# Patient Record
Sex: MALE | Race: WHITE
[De-identification: names, ages, dates, MRNs, and addresses within clinical notes are randomized per-mention and may not be internally consistent; named-entity substitution may affect disease eponyms.]

---

## 2021-03-31 ENCOUNTER — HOSPITAL ENCOUNTER (INPATIENT)
Dept: HOSPITAL 75 - ER | Age: 51
LOS: 2 days | Discharge: HOME | DRG: 246 | End: 2021-04-02
Attending: INTERNAL MEDICINE | Admitting: INTERNAL MEDICINE
Payer: SELF-PAY

## 2021-03-31 VITALS — DIASTOLIC BLOOD PRESSURE: 50 MMHG | SYSTOLIC BLOOD PRESSURE: 85 MMHG

## 2021-03-31 VITALS — SYSTOLIC BLOOD PRESSURE: 145 MMHG | DIASTOLIC BLOOD PRESSURE: 102 MMHG

## 2021-03-31 VITALS — DIASTOLIC BLOOD PRESSURE: 69 MMHG | SYSTOLIC BLOOD PRESSURE: 104 MMHG

## 2021-03-31 VITALS — SYSTOLIC BLOOD PRESSURE: 124 MMHG | DIASTOLIC BLOOD PRESSURE: 86 MMHG

## 2021-03-31 VITALS — SYSTOLIC BLOOD PRESSURE: 123 MMHG | DIASTOLIC BLOOD PRESSURE: 75 MMHG

## 2021-03-31 VITALS — SYSTOLIC BLOOD PRESSURE: 130 MMHG | DIASTOLIC BLOOD PRESSURE: 91 MMHG

## 2021-03-31 VITALS — WEIGHT: 199.52 LBS | BODY MASS INDEX: 31.31 KG/M2 | HEIGHT: 66.93 IN

## 2021-03-31 VITALS — SYSTOLIC BLOOD PRESSURE: 142 MMHG | DIASTOLIC BLOOD PRESSURE: 88 MMHG

## 2021-03-31 VITALS — SYSTOLIC BLOOD PRESSURE: 128 MMHG | DIASTOLIC BLOOD PRESSURE: 84 MMHG

## 2021-03-31 VITALS — SYSTOLIC BLOOD PRESSURE: 85 MMHG | DIASTOLIC BLOOD PRESSURE: 50 MMHG

## 2021-03-31 VITALS — DIASTOLIC BLOOD PRESSURE: 90 MMHG | SYSTOLIC BLOOD PRESSURE: 140 MMHG

## 2021-03-31 VITALS — SYSTOLIC BLOOD PRESSURE: 137 MMHG | DIASTOLIC BLOOD PRESSURE: 81 MMHG

## 2021-03-31 VITALS — DIASTOLIC BLOOD PRESSURE: 91 MMHG | SYSTOLIC BLOOD PRESSURE: 133 MMHG

## 2021-03-31 VITALS — SYSTOLIC BLOOD PRESSURE: 128 MMHG | DIASTOLIC BLOOD PRESSURE: 81 MMHG

## 2021-03-31 VITALS — SYSTOLIC BLOOD PRESSURE: 139 MMHG | DIASTOLIC BLOOD PRESSURE: 89 MMHG

## 2021-03-31 VITALS — DIASTOLIC BLOOD PRESSURE: 52 MMHG | SYSTOLIC BLOOD PRESSURE: 91 MMHG

## 2021-03-31 DIAGNOSIS — I50.21: ICD-10-CM

## 2021-03-31 DIAGNOSIS — I11.0: ICD-10-CM

## 2021-03-31 DIAGNOSIS — F17.210: ICD-10-CM

## 2021-03-31 DIAGNOSIS — E78.5: ICD-10-CM

## 2021-03-31 DIAGNOSIS — I25.10: ICD-10-CM

## 2021-03-31 DIAGNOSIS — I21.09: Primary | ICD-10-CM

## 2021-03-31 DIAGNOSIS — I25.5: ICD-10-CM

## 2021-03-31 LAB
ALBUMIN SERPL-MCNC: 3.9 GM/DL (ref 3.2–4.5)
ALBUMIN SERPL-MCNC: 5 GM/DL (ref 3.2–4.5)
ALP SERPL-CCNC: 58 U/L (ref 40–136)
ALP SERPL-CCNC: 74 U/L (ref 40–136)
ALT SERPL-CCNC: 65 U/L (ref 0–55)
ALT SERPL-CCNC: 90 U/L (ref 0–55)
APTT BLD: 29 SEC (ref 24–35)
BASOPHILS # BLD AUTO: 0.1 10^3/UL (ref 0–0.1)
BASOPHILS # BLD AUTO: 0.1 10^3/UL (ref 0–0.1)
BASOPHILS NFR BLD AUTO: 0 % (ref 0–10)
BASOPHILS NFR BLD AUTO: 0 % (ref 0–10)
BASOPHILS NFR BLD MANUAL: 0 %
BILIRUB SERPL-MCNC: 0.9 MG/DL (ref 0.1–1)
BILIRUB SERPL-MCNC: 1 MG/DL (ref 0.1–1)
BUN/CREAT SERPL: 7
BUN/CREAT SERPL: 8
CALCIUM SERPL-MCNC: 8.2 MG/DL (ref 8.5–10.1)
CALCIUM SERPL-MCNC: 9.4 MG/DL (ref 8.5–10.1)
CHLORIDE SERPL-SCNC: 103 MMOL/L (ref 98–107)
CHLORIDE SERPL-SCNC: 99 MMOL/L (ref 98–107)
CO2 SERPL-SCNC: 23 MMOL/L (ref 21–32)
CO2 SERPL-SCNC: 25 MMOL/L (ref 21–32)
CREAT SERPL-MCNC: 0.93 MG/DL (ref 0.6–1.3)
CREAT SERPL-MCNC: 0.96 MG/DL (ref 0.6–1.3)
EOSINOPHIL # BLD AUTO: 0 10^3/UL (ref 0–0.3)
EOSINOPHIL # BLD AUTO: 0 10^3/UL (ref 0–0.3)
EOSINOPHIL NFR BLD AUTO: 0 % (ref 0–10)
EOSINOPHIL NFR BLD AUTO: 0 % (ref 0–10)
EOSINOPHIL NFR BLD MANUAL: 0 %
GFR SERPLBLD BASED ON 1.73 SQ M-ARVRAT: > 60 ML/MIN
GFR SERPLBLD BASED ON 1.73 SQ M-ARVRAT: > 60 ML/MIN
GLUCOSE SERPL-MCNC: 138 MG/DL (ref 70–105)
GLUCOSE SERPL-MCNC: 153 MG/DL (ref 70–105)
HCT VFR BLD CALC: 43 % (ref 40–54)
HCT VFR BLD CALC: 49 % (ref 40–54)
HGB BLD-MCNC: 14.4 G/DL (ref 13.3–17.7)
HGB BLD-MCNC: 16.7 G/DL (ref 13.3–17.7)
INR PPP: 0.9 (ref 0.8–1.4)
LYMPHOCYTES # BLD AUTO: 0.6 10^3/UL (ref 1–4)
LYMPHOCYTES # BLD AUTO: 0.9 10^3/UL (ref 1–4)
LYMPHOCYTES NFR BLD AUTO: 5 % (ref 12–44)
LYMPHOCYTES NFR BLD AUTO: 6 % (ref 12–44)
MAGNESIUM SERPL-MCNC: 2.1 MG/DL (ref 1.6–2.4)
MANUAL DIFFERENTIAL PERFORMED BLD QL: NO
MANUAL DIFFERENTIAL PERFORMED BLD QL: YES
MCH RBC QN AUTO: 30 PG (ref 25–34)
MCH RBC QN AUTO: 30 PG (ref 25–34)
MCHC RBC AUTO-ENTMCNC: 33 G/DL (ref 32–36)
MCHC RBC AUTO-ENTMCNC: 34 G/DL (ref 32–36)
MCV RBC AUTO: 89 FL (ref 80–99)
MCV RBC AUTO: 91 FL (ref 80–99)
MONOCYTES # BLD AUTO: 0.5 10^3/UL (ref 0–1)
MONOCYTES # BLD AUTO: 0.7 10^3/UL (ref 0–1)
MONOCYTES NFR BLD AUTO: 4 % (ref 0–12)
MONOCYTES NFR BLD AUTO: 5 % (ref 0–12)
MONOCYTES NFR BLD: 3 %
NEUTROPHILS # BLD AUTO: 12.3 10^3/UL (ref 1.8–7.8)
NEUTROPHILS # BLD AUTO: 13 10^3/UL (ref 1.8–7.8)
NEUTROPHILS NFR BLD AUTO: 88 % (ref 42–75)
NEUTROPHILS NFR BLD AUTO: 91 % (ref 42–75)
NEUTS BAND NFR BLD MANUAL: 90 %
NEUTS BAND NFR BLD: 1 %
PLATELET # BLD: 285 10^3/UL (ref 130–400)
PLATELET # BLD: 286 10^3/UL (ref 130–400)
PMV BLD AUTO: 10.2 FL (ref 9–12.2)
PMV BLD AUTO: 10.5 FL (ref 9–12.2)
POTASSIUM SERPL-SCNC: 3.7 MMOL/L (ref 3.6–5)
POTASSIUM SERPL-SCNC: 4 MMOL/L (ref 3.6–5)
PROT SERPL-MCNC: 6.1 GM/DL (ref 6.4–8.2)
PROT SERPL-MCNC: 7.9 GM/DL (ref 6.4–8.2)
PROTHROMBIN TIME: 13 SEC (ref 12.2–14.7)
RBC MORPH BLD: NORMAL
SODIUM SERPL-SCNC: 135 MMOL/L (ref 135–145)
SODIUM SERPL-SCNC: 136 MMOL/L (ref 135–145)
VARIANT LYMPHS NFR BLD MANUAL: 6 %
WBC # BLD AUTO: 13.6 10^3/UL (ref 4.3–11)
WBC # BLD AUTO: 14.8 10^3/UL (ref 4.3–11)

## 2021-03-31 PROCEDURE — 84484 ASSAY OF TROPONIN QUANT: CPT

## 2021-03-31 PROCEDURE — 83735 ASSAY OF MAGNESIUM: CPT

## 2021-03-31 PROCEDURE — 93041 RHYTHM ECG TRACING: CPT

## 2021-03-31 PROCEDURE — 86850 RBC ANTIBODY SCREEN: CPT

## 2021-03-31 PROCEDURE — 86901 BLOOD TYPING SEROLOGIC RH(D): CPT

## 2021-03-31 PROCEDURE — 93005 ELECTROCARDIOGRAM TRACING: CPT

## 2021-03-31 PROCEDURE — B2151ZZ FLUOROSCOPY OF LEFT HEART USING LOW OSMOLAR CONTRAST: ICD-10-PCS | Performed by: INTERNAL MEDICINE

## 2021-03-31 PROCEDURE — 85007 BL SMEAR W/DIFF WBC COUNT: CPT

## 2021-03-31 PROCEDURE — 027035Z DILATION OF CORONARY ARTERY, ONE ARTERY WITH TWO DRUG-ELUTING INTRALUMINAL DEVICES, PERCUTANEOUS APPROACH: ICD-10-PCS | Performed by: INTERNAL MEDICINE

## 2021-03-31 PROCEDURE — 85025 COMPLETE CBC W/AUTO DIFF WBC: CPT

## 2021-03-31 PROCEDURE — 80048 BASIC METABOLIC PNL TOTAL CA: CPT

## 2021-03-31 PROCEDURE — 85730 THROMBOPLASTIN TIME PARTIAL: CPT

## 2021-03-31 PROCEDURE — 80061 LIPID PANEL: CPT

## 2021-03-31 PROCEDURE — 4A023N7 MEASUREMENT OF CARDIAC SAMPLING AND PRESSURE, LEFT HEART, PERCUTANEOUS APPROACH: ICD-10-PCS | Performed by: INTERNAL MEDICINE

## 2021-03-31 PROCEDURE — 80053 COMPREHEN METABOLIC PANEL: CPT

## 2021-03-31 PROCEDURE — B2111ZZ FLUOROSCOPY OF MULTIPLE CORONARY ARTERIES USING LOW OSMOLAR CONTRAST: ICD-10-PCS | Performed by: INTERNAL MEDICINE

## 2021-03-31 PROCEDURE — 83880 ASSAY OF NATRIURETIC PEPTIDE: CPT

## 2021-03-31 PROCEDURE — 86900 BLOOD TYPING SEROLOGIC ABO: CPT

## 2021-03-31 PROCEDURE — 83874 ASSAY OF MYOGLOBIN: CPT

## 2021-03-31 PROCEDURE — 71045 X-RAY EXAM CHEST 1 VIEW: CPT

## 2021-03-31 PROCEDURE — 87081 CULTURE SCREEN ONLY: CPT

## 2021-03-31 PROCEDURE — 84100 ASSAY OF PHOSPHORUS: CPT

## 2021-03-31 PROCEDURE — 85027 COMPLETE CBC AUTOMATED: CPT

## 2021-03-31 PROCEDURE — 85610 PROTHROMBIN TIME: CPT

## 2021-03-31 PROCEDURE — 93458 L HRT ARTERY/VENTRICLE ANGIO: CPT

## 2021-03-31 PROCEDURE — 36415 COLL VENOUS BLD VENIPUNCTURE: CPT

## 2021-03-31 RX ADMIN — OMEGA-3 FATTY ACIDS CAP 1000 MG SCH MG: 1000 CAP at 18:38

## 2021-03-31 RX ADMIN — SODIUM CHLORIDE SCH MLS/HR: 900 INJECTION, SOLUTION INTRAVENOUS at 12:17

## 2021-03-31 RX ADMIN — TICAGRELOR SCH MG: 90 TABLET ORAL at 20:21

## 2021-03-31 RX ADMIN — CARVEDILOL SCH MG: 3.12 TABLET, FILM COATED ORAL at 20:20

## 2021-03-31 RX ADMIN — SODIUM CHLORIDE SCH MLS/HR: 900 INJECTION, SOLUTION INTRAVENOUS at 20:19

## 2021-03-31 NOTE — CARDIAC CATH REPORT
Cardiac Cath Report


Physician (s)/Assistant (s)


Physician


ERIN RESENDIZ MD





Pre-Procedure Diagnosis


Pre-Procedure Diagnosis:  Acute ST elevation myocardial infarction





Post-Procedure Note


Procedure Start Date:  Mar 31, 2021


Name of Procedure:  


Left heart catheterization


Emergency stenting to the LAD


Findings/Procedure Note


PROCEDURE NOTE:


50 years old gentleman with no significant past medical history, has strong 

family history of heart disease and an active smoker admitted with acute ST 

elevation myocardial infarction, Cath Lab was activated and patient was brought 

for emergency cardiac catheterization possible PTCA.


After explaining the procedure to the patient, all pros and cons were explained,

all questions were answered.  The patient signed the consent and then he  was 

placed on the cardiac catheterization laboratory. Groin was prepped SL fashion 

local anesthesia was used. Sheath placed in the right femoral artery. Francisco 

right was advanced to the right coronary artery and angiogram was done, patient 

was given 6000 units of heparin, double bolus Integrilin, FL 3.5 guide was 

advanced to the left coronary system, some difficulty in intubating the left 

main artery then patient had total occlusion to the LAD, BMW wire was advanced 

through the occlusion with difficulties and I was able to park it distally then 

predilatation with a 2 x 20 balloon and reestablishment of flow, door to balloon

time was 37 minutes.  Then I proceeded with deployment of Mariel 2.5 x 23 mm 

stent, there was distal embolization, Cardene intracoronary was given, a second 

proximal stent Mariel 2.75 x 12 mm was deployed overlapping with the first 

stent, angiogram showed excellent results.  Pigtail catheter was advanced to the

left ventricular cavity, left ventriculogram was done.


At the end of the procedure the sheath was removed. Closure device was deployed





FINDINGS:





Hemodynamics 


/24, end-diastolic pressure of 24


Aorta 140/85 mean of 107





ANATOMY:


Left Main is normal


Left Anterior Descending totally occluded at the midportion, successful 

emergency angioplasty and deployment of 2 overlapping stent Mariel 2.75 x 12 mm 

followed by 2.5 x 23 mm with excellent results, reestablishment of flow, 

initially distal embolization but resolved with Cardene and Integrilin.


Left Circumflex is moderate in size with mild to moderate disease nonobstructive

disease


Right Coronory Artery is dominant artery with mild disease in the midportion


LV Gram was done showing dilated left ventricle, akinesia of the anterior wall, 

anterior apex and apex with severe hypokinesia, EF 30%





CONCLUSION:


1.  Acute ST elevation myocardial infarction with emergency catheterization and 

stenting to the LAD, door to balloon time 37 minutes


2.  Total occlusion of the LAD, successful deployment of 2 overlapping Mariel 

stent 2.75 x 12 mm followed by 2.5 x 23 mm expanded to 2.95 proximally and 2.75 

distally with excellent results, initially there was distal embolization 

resolved after Integrilin and Cardene


3.  Mild nonobstructive disease in the right coronary artery and circumflex 

artery


4.  Dilated left ventricle with stunned myocardium, akinesia of the anterior 

wall anterior apex and apex with EF 30%, elevated left ventricular end-diastolic

pressure





DISCUSSION AND RECOMMENDATION:


Maximize medical therapy, patient was started on Coreg, losartan, aspirin, 

Brilinta, Lipitor 80 mg, fish oil, planning to place a LifeVest


Anesthesia Type:  Conscious Sedation


Estimated blood loss (mL):  35 ml


Contrast Amount:  130 ml


Total Radiation Dose:  1452 mGy





Post-Procedure Diagnosis


Post-operative diagnosis:  


Acute ST elevation myocardial infarction


Coronary artery disease


Congestive heart failure


Hypertension











ERIN RESENDIZ MD             Mar 31, 2021 11:19 am

## 2021-03-31 NOTE — ED CHEST PAIN
General


Stated Complaint:  CP


Source:  patient


Exam Limitations:  no limitations





History of Present Illness


Date Seen by Provider:  Mar 31, 2021


Time Seen by Provider:  10:06


Initial Comments


Patient presents ER by private conveyance with his sister and chief complaint 

that about 3:00 this morning he was awoken with severe 9 out of 10 chest pain 

midsternum, substernal nonradiating with nausea and vomiting x1.  No fever 

chills cough shortness of air.  No history of heart disease.  He has no history 

of hyperlipidemia, hypertension or diabetes.  Primary care to Dr. Breaux.  Family 

history of coronary artery disease in his father.  He smokes cigarettes





Allergies and Home Medications


Allergies


Coded Allergies:  


     No Known Drug Allergies (Unverified , 1/22/10)





Home Medications


Ciprofloxacin 500 Mg Tablet, 1 TAB PO BID


   Prescribed by: LINK LARIOS on 1/25/11 0730


Hydrocodone Bit/Acetaminophen 1 Each Tablet, 1-2 EACH PO Q6H PRN


   Prescribed by: LINK LARIOS on 1/25/11 0730


Ranitidine Hcl 150 Mg Tablet, 1 TAB PO BID


   Prescribed by: LINK LARIOS on 1/25/11 0730





Patient Home Medication List


Home Medication List Reviewed:  Yes





Review of Systems


Review of Systems


Constitutional:  No chills, No fever, No malaise


EENTM:  No Blurred Vision, No Double Vision


Respiratory:  Denies Cough; Shortness of Air


Cardiovascular:  Chest Pain; Denies Lightheadedness


Gastrointestinal:  Denies Constipated, Denies Diarrhea; Nausea; Denies Poor 

Fluid Intake; Vomiting


Genitourinary:  Denies Burning, Denies Discharge


Musculoskeletal:  No back pain, No joint pain





All Other Systems Reviewed


Negative Unless Noted:  Yes





Past Medical-Social-Family Hx


Patient Social History


Alcohol Use:  Denies Use


Smoking Status:  Current Everyday Smoker


Type Used:  Cigarettes (Half pack per day)





Physical Exam


Vital Signs





Vital Signs - First Documented




















Capillary Refill :


Height, Weight, BMI


Height: '"


Weight: lbs. oz. kg;  BMI


Method:


General Appearance:  WD/WN, Anxious


HEENT:  PERRL/EOMI, Pharynx Normal, Moist Mucous Membranes


Neck:  Normal Inspection, Non Tender


Respiratory:  Chest Non Tender, Lungs Clear, Normal Breath Sounds, No Accessory 

Muscle Use, No Respiratory Distress


Cardiovascular:  Regular Rate, Rhythm, No Edema, Normal Peripheral Pulses


Gastrointestinal:  Normal Bowel Sounds, Non Tender, Soft


Neurologic/Psychiatric:  Alert, Oriented x3


Skin:  Normal Color, Warm/Dry





Progress/Results/Core Measures


Results/Orders


Lab Results





Laboratory Tests








Test


 3/31/21


10:08 Range/Units


 


 


White Blood Count


 13.6 H


 4.3-11.0


10^3/uL


 


Red Blood Count


 5.49 


 4.30-5.52


10^6/uL


 


Hemoglobin 16.7  13.3-17.7  g/dL


 


Hematocrit 49  40-54  %


 


Mean Corpuscular Volume 89  80-99  fL


 


Mean Corpuscular Hemoglobin 30  25-34  pg


 


Mean Corpuscular Hemoglobin


Concent 34 


 32-36  g/dL





 


Red Cell Distribution Width 11.8  10.0-14.5  %


 


Platelet Count


 285 


 130-400


10^3/uL


 


Mean Platelet Volume 10.2  9.0-12.2  fL


 


Immature Granulocyte % (Auto) 0   %


 


Neutrophils (%) (Auto) 91 H 42-75  %


 


Lymphocytes (%) (Auto) 5 L 12-44  %


 


Monocytes (%) (Auto) 4  0-12  %


 


Eosinophils (%) (Auto) 0  0-10  %


 


Basophils (%) (Auto) 0  0-10  %


 


Neutrophils # (Auto)


 12.3 H


 1.8-7.8


10^3/uL


 


Lymphocytes # (Auto)


 0.6 L


 1.0-4.0


10^3/uL


 


Monocytes # (Auto)


 0.5 


 0.0-1.0


10^3/uL


 


Eosinophils # (Auto)


 0.0 


 0.0-0.3


10^3/uL


 


Basophils # (Auto)


 0.1 


 0.0-0.1


10^3/uL


 


Immature Granulocyte # (Auto)


 0.1 


 0.0-0.1


10^3/uL


 


Neutrophils % (Manual) 90   %


 


Lymphocytes % (Manual) 6   %


 


Monocytes % (Manual) 3   %


 


Eosinophils % (Manual) 0   %


 


Basophils % (Manual) 0   %


 


Band Neutrophils 1   %


 


Blood Morphology Comment NORMAL   


 


Prothrombin Time 13.0  12.2-14.7  SEC


 


INR Comment 0.9  0.8-1.4  


 


Activated Partial


Thromboplast Time 29 


 24-35  SEC





 


Sodium Level 136  135-145  MMOL/L


 


Potassium Level 3.7  3.6-5.0  MMOL/L


 


Chloride Level 99    MMOL/L


 


Carbon Dioxide Level 25  21-32  MMOL/L


 


Anion Gap 12  5-14  MMOL/L


 


Blood Urea Nitrogen 7  7-18  MG/DL


 


Creatinine


 0.93 


 0.60-1.30


MG/DL


 


Estimat Glomerular Filtration


Rate > 60 


  





 


BUN/Creatinine Ratio 8   


 


Glucose Level 153 H   MG/DL


 


Calcium Level 9.4  8.5-10.1  MG/DL


 


Corrected Calcium   8.5-10.1  MG/DL


 


Magnesium Level 2.1  1.6-2.4  MG/DL


 


Total Bilirubin 0.9  0.1-1.0  MG/DL


 


Aspartate Amino Transf


(AST/SGOT) 83 H


 5-34  U/L





 


Alanine Aminotransferase


(ALT/SGPT) 65 H


 0-55  U/L





 


Alkaline Phosphatase 74    U/L


 


Myoglobin


 726.6 H


 10.0-92.0


NG/ML


 


Troponin I 6.809 *H <0.028  NG/ML


 


B-Type Natriuretic Peptide 42.9  <100.0  PG/ML


 


Total Protein 7.9  6.4-8.2  GM/DL


 


Albumin 5.0 H 3.2-4.5  GM/DL








My Orders





Orders - GUADALUPE TERRAZAS


Nitroglycerin 0.4 Mg Btl 25's (Nitrostat (3/31/21 10:04)


Aspirin Chewable Tablet (Baby Aspirin Ch (3/31/21 10:04)


Cbc With Automated Diff (3/31/21 10:12)


Magnesium (3/31/21 10:12)


Ekg Tracing (3/31/21 10:12)


Comprehensive Metabolic Panel (3/31/21 10:12)


Myoglobin Serum (3/31/21 10:12)


Protime With Inr (3/31/21 10:12)


Partial Thromboplastin Time (3/31/21 10:12)


O2 (3/31/21 10:12)


Monitor-Rhythm Ecg Trace Only (3/31/21 10:12)


Lipid Panel (4/1/21 06:00)


Ed Iv/Invasive Line Start (3/31/21 10:12)


BNP (3/31/21 10:12)


Troponin I (3/31/21 10:12)


Nitroglycerin 0.4 Mg Btl 25's (Nitrostat (3/31/21 10:15)


Heparin Injection (Heparin Injection) (3/31/21 10:15)


Aspirin Chewable Tablet (Baby Aspirin Ch (3/31/21 10:15)


Manual Differential (3/31/21 10:08)





Medications Given in ED





Current Medications








 Medications  Dose


 Ordered  Sig/Marj


 Route  Start Time


 Stop Time Status Last Admin


Dose Admin


 


 Aspirin  324 mg  ONCE  ONCE


 PO  3/31/21 10:15


 3/31/21 10:16 DC 3/31/21 10:09


324 MG


 


 Nitroglycerin  0.4 mg  UD  PRN


 SL  3/31/21 10:15


    3/31/21 10:10


0.4 MG








Vital Signs/I&O











 3/31/21 3/31/21 3/31/21





 10:02 10:02 10:02


 


Temp  36.7 


 


Pulse  88 


 


Resp  18 


 


B/P (MAP)  173/107 (129) 


 


Pulse Ox  97 98


 


O2 Delivery Nasal Cannula Nasal Cannula Nasal Cannula


 


O2 Flow Rate 2.0  2.00











Progress


Progress Note :  


   Time:  10:10


Progress Note


STEMI: Heparin 5000 units IV, aspirin and nitroglycerin ordered.  Dr. Chao to 

the ER and seeing the patient.


Initial ECG Impression Date:  Mar 31, 2021


Initial ECG Impression Time:  10:10


Initial ECG Rate:  85


Initial ECG Rhythm:  Normal Sinus


Initial ECG Intervals:  Normal


Initial ECG Impression:  Acute MI


Initial ECG Comparisson:  No Previous ECG Available


Comment


Sinus rhythm with ST elevation in the anterior leads V1, V2 V3, V4, V5 and V6.





Departure


Communication (Admissions)


Time/Spoke to Admitting Phy:  10:10


Call Dr. Mcmillan and he is in the ER [phone] to examine the patient.  Plans to 

take straight to Cath Lab.





Impression





   Primary Impression:  


   STEMI (ST elevation myocardial infarction)


   Qualified Codes:  I21.3 - ST elevation (STEMI) myocardial infarction of 

   unspecified site


Disposition:  01 HOME, SELF-CARE


Condition:  Stable





Admissions


Decision to Admit Reason:  Admit from ER (General)


Decision to Admit/Date:  Mar 31, 2021


Time/Decision to Admit Time:  10:10





Departure-Patient Inst.


Referrals:  


ADELA ACUNA MD (PCP/Family)


Primary Care Physician











GUADALUPE TERRAZAS                 Mar 31, 2021 10:16

## 2021-03-31 NOTE — CARDIOLOGY HISTORY & PHYSICAL
HPI-Cardiology


Cardiology Consultation


Date of Consultation


3/31/21


Date of Admission





Time Seen by Provider:  11:01


Indication:  Chest pain





HPI


50 years old gentleman with no significant past medical history, strong family 

history of heart disease, started to have chest pain around 3 AM, came into the 

emergency room when the pain persisted and noted to have acute ST elevation in 

the anterior wall.  On my evaluation he was still having mild chest pain, 

received some nitroglycerin, received aspirin.  No palpitation, no syncope or 

near syncopal episodes.  No claudications.





PMH-Cardiology


Other PMHx





no known pmhx





Social History


Patient Social History


Marrital Status:  


Employed/Student:  employed


Smoking:  Current every day smoker





Family Hx


Other


Father has history of heart attack and coronary artery disease





ROS-Cardiology


Review of Systems


General:  No Chills, No Night Sweats, No Fatigue, No Malaise, No Appetite


HEENT:  No Head Aches, No Visual Changes, No Eye Pain, No Ear Pain, No 

Dysphasia, No Sinus Congestion, No Post Nasal Drip, No Sore Throat


Pulmonary:  No Dyspnea, No Cough, No Pleuritic Chest Pain


Cardiovascular:  Chest Pain; No: Palpitations, Orthopnea, Paroxysmal Noc. 

Dyspnea, Edema, Lt Headedness


Gastrointestinal:  No: Nausea, Vomiting, Abdominal Pain, Diarrhea, Constipation,

Melena, Hematochezia


Genitourinary:  No Dysuria, No Frequency, No Incontinence, No Hematuria, No 

Retention


Musculoskeletal:  No: neck pain, shoulder pain, arm pain, back pain, hand pain, 

leg pain, foot pain


Neurological:  No: Weakness, Numbness, Incoordination, Change in speech, 

Confusion, Seizures





Home Medications & Allergies


Allergies:  


Coded Allergies:  


     No Known Drug Allergies (Unverified  Allergy, Mild, 1/22/10)


Home Medication List Reviewed:  Yes





Exam-Cardiology


Exam


General Appearance:  Alert, Oriented X3, Cooperative, No Acute Distress


HEENT:  Atraumatic, PERRLA


Respiratory:  Clear to Auscultation, Normal Air Movement


Cardiovascular:  Regular Rate, Normal S1, Normal S2, No Murmurs


Abdominal:  Normal Bowel Sounds, Soft, No Tenderness, No Hepatosplenomegaly, No 

Masses


Extremities:  No Clubbing, No Cyanosis, No Edema, Normal Pulses, No 

Tenderness/Swelling


Skin:  No Rashes, No Breakdown, No Significant Lesion


Neuro:  Normal Gait, Normal Speech, Strength at 5/5 X4 Ext, Normal Tone, 

Sensation Intact


Psych/Mental Status:  Mental Status NL, Mood NL





Results


Labs


Labs


Laboratory Tests


3/31/21 10:08: 


White Blood Count 13.6H, Red Blood Count 5.49, Hemoglobin 16.7, Hematocrit 49, 

Mean Corpuscular Volume 89, Mean Corpuscular Hemoglobin 30, Mean Corpuscular 

Hemoglobin Concent 34, Red Cell Distribution Width 11.8, Platelet Count 285, 

Mean Platelet Volume 10.2, Immature Granulocyte % (Auto) 0, Neutrophils (%) 

(Auto) 91H, Lymphocytes (%) (Auto) 5L, Monocytes (%) (Auto) 4, Eosinophils (%) 

(Auto) 0, Basophils (%) (Auto) 0, Neutrophils # (Auto) 12.3H, Lymphocytes # 

(Auto) 0.6L, Monocytes # (Auto) 0.5, Eosinophils # (Auto) 0.0, Basophils # 

(Auto) 0.1, Immature Granulocyte # (Auto) 0.1, Prothrombin Time 13.0, INR 

Comment 0.9, Activated Partial Thromboplast Time 29, Sodium Level 136, Potassium

Level 3.7, Chloride Level 99, Carbon Dioxide Level 25, Anion Gap 12, Blood Urea 

Nitrogen 7, Creatinine 0.93, Estimat Glomerular Filtration Rate > 60, 

BUN/Creatinine Ratio 8, Glucose Level 153H, Calcium Level 9.4, Corrected Calcium

, Magnesium Level 2.1, Total Bilirubin 0.9, Aspartate Amino Transf (AST/SGOT) 

83H, Alanine Aminotransferase (ALT/SGPT) 65H, Alkaline Phosphatase 74, Myoglobin

726.6H, B-Type Natriuretic Peptide 42.9, Total Protein 7.9, Albumin 5.0H








A/P-Cardiology


Admission Diagnosis


Acute ST elevation myocardial infarction


Coronary artery disease


Congestive heart failure, acute left ventricular systolic dysfunction, ischemic 

cardiomyopathy, EF 30%


Tobaccoism


Admission Status:  Inpatient Order (span 2 midnights)


Reason for Inpatient Admission:  


Acute ST elevation myocardial infarction





Assessment/Plan


Acute ST elevation myocardial infarction in the anterior wall, started at 3 AM, 

arrived to the emergency room had 9:58 AM, reestablishment of flow at 1035.  

Excellent results.  Status post stenting to the LAD





Coronary artery disease, cardiac catheterization was done with emergency 

stenting to the LAD using 2 overlapping stent 2.75 x 12 and 2.5 x 23 Mariel 

stents expanded to 3.0 proximally and 2.75 distally with excellent results, mild

disease in the mid right coronary artery nonobstructive disease





Congestive heart failure, acute left ventricular systolic dysfunction, anterior 

and apical akinesia, could be stunned myocardium, will start on beta-blockers 

and ACE inhibitor and planning to place a LifeVest.  Ejection fraction 30%





Hypertension, starting on beta-blockers and ARB and monitor tolerance and 

response





Hyperlipidemia, starting on Lipitor 80 mg empirically





Family history of heart disease





Tobaccoism, educated on smoking cessation











ERIN RESENDIZ MD             Mar 31, 2021 11:06 am

## 2021-03-31 NOTE — CARDIAC PROCEDURE NOTE-CS/ASA
Pre-Procedure Note


Pre-Op Procedure Note


H&P Reviewed


The H&P was reviewed, patient examined and no changes noted.


Date H&P Reviewed:  Mar 31, 2021


Time H&P Reviewed:  10:30





Conscious Sedation Pre-Proced


Time


10:30





ASA Score


3


For ASA 3 and 4: Consider anesthesia and medical clearance. Also, for patients

with a history of failed moderate sedation consider anesthesia.

















Airway 


 


Lungs 


 


Heart 


 


 ASA score


 


 ASA 1: a normal healthy patient


 


 ASA 2:  a patient with a mild systemic disease (mid diabetes, controlled 

hypertension, obesity 


 


x ASA 3:  a patient with a severe systemic disease that limits activity  

(angina, COPD, prior Myocardial infarction)


 


 ASA 4:  a patient with an incapacitating disease that is a constant threat to 

life (CHF, renal failure)


 


 ASA 5:  a moribund patient not expected to survive 24 hrs.  (ruptured aneurysm)


 


 ASA 6:  a declared brain-dead patient whose organs are being harvested.


 


 For emergent operations, add the letter E after the classification











Mallampati Classification


Grade 3





Sedation Plan


Analgesia, Amnesia, Plan communicated to team members, Discussed options with 

patient/fam, Discussed risks with patient/fam


The patient is an appropriate candidate to undergo the planned procedure, 

sedation, and anesthesia.





The patient immediately re-assessed prior to indication.











ERIN RESENDIZ MD             Mar 31, 2021 11:00 am

## 2021-04-01 VITALS — SYSTOLIC BLOOD PRESSURE: 105 MMHG | DIASTOLIC BLOOD PRESSURE: 72 MMHG

## 2021-04-01 VITALS — SYSTOLIC BLOOD PRESSURE: 120 MMHG | DIASTOLIC BLOOD PRESSURE: 77 MMHG

## 2021-04-01 VITALS — DIASTOLIC BLOOD PRESSURE: 88 MMHG | SYSTOLIC BLOOD PRESSURE: 101 MMHG

## 2021-04-01 VITALS — DIASTOLIC BLOOD PRESSURE: 77 MMHG | SYSTOLIC BLOOD PRESSURE: 129 MMHG

## 2021-04-01 VITALS — DIASTOLIC BLOOD PRESSURE: 52 MMHG | SYSTOLIC BLOOD PRESSURE: 91 MMHG

## 2021-04-01 VITALS — SYSTOLIC BLOOD PRESSURE: 123 MMHG | DIASTOLIC BLOOD PRESSURE: 86 MMHG

## 2021-04-01 VITALS — SYSTOLIC BLOOD PRESSURE: 125 MMHG | DIASTOLIC BLOOD PRESSURE: 95 MMHG

## 2021-04-01 VITALS — DIASTOLIC BLOOD PRESSURE: 79 MMHG | SYSTOLIC BLOOD PRESSURE: 110 MMHG

## 2021-04-01 VITALS — SYSTOLIC BLOOD PRESSURE: 93 MMHG | DIASTOLIC BLOOD PRESSURE: 55 MMHG

## 2021-04-01 VITALS — SYSTOLIC BLOOD PRESSURE: 94 MMHG | DIASTOLIC BLOOD PRESSURE: 84 MMHG

## 2021-04-01 VITALS — DIASTOLIC BLOOD PRESSURE: 94 MMHG | SYSTOLIC BLOOD PRESSURE: 113 MMHG

## 2021-04-01 VITALS — DIASTOLIC BLOOD PRESSURE: 78 MMHG | SYSTOLIC BLOOD PRESSURE: 118 MMHG

## 2021-04-01 VITALS — DIASTOLIC BLOOD PRESSURE: 85 MMHG | SYSTOLIC BLOOD PRESSURE: 120 MMHG

## 2021-04-01 VITALS — DIASTOLIC BLOOD PRESSURE: 56 MMHG | SYSTOLIC BLOOD PRESSURE: 97 MMHG

## 2021-04-01 VITALS — SYSTOLIC BLOOD PRESSURE: 124 MMHG | DIASTOLIC BLOOD PRESSURE: 82 MMHG

## 2021-04-01 VITALS — SYSTOLIC BLOOD PRESSURE: 113 MMHG | DIASTOLIC BLOOD PRESSURE: 68 MMHG

## 2021-04-01 VITALS — SYSTOLIC BLOOD PRESSURE: 114 MMHG | DIASTOLIC BLOOD PRESSURE: 70 MMHG

## 2021-04-01 VITALS — SYSTOLIC BLOOD PRESSURE: 104 MMHG | DIASTOLIC BLOOD PRESSURE: 67 MMHG

## 2021-04-01 LAB
BASOPHILS # BLD AUTO: 0 10^3/UL (ref 0–0.1)
BASOPHILS NFR BLD AUTO: 0 % (ref 0–10)
BUN/CREAT SERPL: 11
CALCIUM SERPL-MCNC: 8.1 MG/DL (ref 8.5–10.1)
CHLORIDE SERPL-SCNC: 106 MMOL/L (ref 98–107)
CHOLEST SERPL-MCNC: 146 MG/DL (ref ?–200)
CO2 SERPL-SCNC: 20 MMOL/L (ref 21–32)
CREAT SERPL-MCNC: 0.81 MG/DL (ref 0.6–1.3)
EOSINOPHIL # BLD AUTO: 0.1 10^3/UL (ref 0–0.3)
EOSINOPHIL NFR BLD AUTO: 1 % (ref 0–10)
GFR SERPLBLD BASED ON 1.73 SQ M-ARVRAT: > 60 ML/MIN
GLUCOSE SERPL-MCNC: 149 MG/DL (ref 70–105)
HCT VFR BLD CALC: 38 % (ref 40–54)
HDLC SERPL-MCNC: 25 MG/DL (ref 40–60)
HGB BLD-MCNC: 13 G/DL (ref 13.3–17.7)
LYMPHOCYTES # BLD AUTO: 0.9 10^3/UL (ref 1–4)
LYMPHOCYTES NFR BLD AUTO: 8 % (ref 12–44)
MAGNESIUM SERPL-MCNC: 1.9 MG/DL (ref 1.6–2.4)
MANUAL DIFFERENTIAL PERFORMED BLD QL: NO
MCH RBC QN AUTO: 31 PG (ref 25–34)
MCHC RBC AUTO-ENTMCNC: 34 G/DL (ref 32–36)
MCV RBC AUTO: 92 FL (ref 80–99)
MONOCYTES # BLD AUTO: 1.2 10^3/UL (ref 0–1)
MONOCYTES NFR BLD AUTO: 10 % (ref 0–12)
NEUTROPHILS # BLD AUTO: 9.9 10^3/UL (ref 1.8–7.8)
NEUTROPHILS NFR BLD AUTO: 81 % (ref 42–75)
PHOSPHATE SERPL-MCNC: 2 MG/DL (ref 2.3–4.7)
PLATELET # BLD: 240 10^3/UL (ref 130–400)
PMV BLD AUTO: 10.8 FL (ref 9–12.2)
POTASSIUM SERPL-SCNC: 3.7 MMOL/L (ref 3.6–5)
SODIUM SERPL-SCNC: 136 MMOL/L (ref 135–145)
TRIGL SERPL-MCNC: 135 MG/DL (ref ?–150)
VLDLC SERPL CALC-MCNC: 27 MG/DL (ref 5–40)
WBC # BLD AUTO: 12.2 10^3/UL (ref 4.3–11)

## 2021-04-01 RX ADMIN — TICAGRELOR SCH MG: 90 TABLET ORAL at 09:14

## 2021-04-01 RX ADMIN — MAGNESIUM SULFATE IN DEXTROSE SCH MLS/HR: 10 INJECTION, SOLUTION INTRAVENOUS at 05:41

## 2021-04-01 RX ADMIN — SODIUM CHLORIDE SCH MLS/HR: 900 INJECTION, SOLUTION INTRAVENOUS at 08:23

## 2021-04-01 RX ADMIN — OMEGA-3 FATTY ACIDS CAP 1000 MG SCH MG: 1000 CAP at 09:14

## 2021-04-01 RX ADMIN — CARVEDILOL SCH MG: 3.12 TABLET, FILM COATED ORAL at 20:24

## 2021-04-01 RX ADMIN — POTASSIUM CHLORIDE SCH MEQ: 1500 TABLET, EXTENDED RELEASE ORAL at 05:41

## 2021-04-01 RX ADMIN — OMEGA-3 FATTY ACIDS CAP 1000 MG SCH MG: 1000 CAP at 16:57

## 2021-04-01 RX ADMIN — SODIUM CHLORIDE SCH MLS/HR: 900 INJECTION, SOLUTION INTRAVENOUS at 16:57

## 2021-04-01 RX ADMIN — PANTOPRAZOLE SODIUM SCH MG: 40 TABLET, DELAYED RELEASE ORAL at 09:14

## 2021-04-01 RX ADMIN — CARVEDILOL SCH MG: 3.12 TABLET, FILM COATED ORAL at 09:14

## 2021-04-01 RX ADMIN — POTASSIUM CHLORIDE SCH MLS/HR: 200 INJECTION, SOLUTION INTRAVENOUS at 05:41

## 2021-04-01 RX ADMIN — TICAGRELOR SCH MG: 90 TABLET ORAL at 20:25

## 2021-04-01 RX ADMIN — ASPIRIN SCH MG: 81 TABLET ORAL at 09:14

## 2021-04-01 RX ADMIN — LOSARTAN POTASSIUM SCH MG: 25 TABLET, FILM COATED ORAL at 09:15

## 2021-04-01 NOTE — CARDIOLOGY PROGRESS NOTE
Subjective


Date Seen by Provider:  Apr 1, 2021


Time Seen by Provider:  08:50


Subjective/Events-last exam


Patient is laying down in bed, feeling better, no new complaint, groin is 

healing well.


Review of Systems


General:  No Chills, No Night Sweats, No Fatigue, No Malaise, No Appetite, No 

Other


HEENT:  No Head Aches, No Visual Changes, No Eye Pain, No Ear Pain, No Dyspha

gaston, No Sinus Congestion, No Post Nasal Drip, No Sore Throat, No Other


Pulmonary:  No Dyspnea, No Cough, No Pleuritic Chest Pain, No Other


Cardiovascular:  No: Chest Pain, Palpitations, Orthopnea, Paroxysmal Noc. 

Dyspnea, Edema, Lt Headedness, Other





Objective-Cardiology


Exam


Last Set of Vital Signs





Vital Signs








 4/1/21 4/1/21 4/1/21





 03:25 05:00 06:00


 


Temp 37.0  


 


Pulse   68


 


Resp  20 


 


B/P (MAP)   113/68 (83)


 


Pulse Ox   95


 


O2 Delivery   Room Air





Capillary Refill : Less Than 3 Seconds


I&O











Intake and Output 


 


 4/1/21





 00:00


 


Intake Total 390 ml


 


Balance 390 ml


 


 


 


Intake Oral 390 ml


 


# Voids 2


 


Daily Weight Change No








General:  Alert, Oriented X3, Cooperative, No Acute Distress


HEENT:  Atraumatic, PERRLA


Lungs:  Clear to Auscultation, Normal Air Movement


Heart:  Regular Rate, Normal S1, Normal S2, No Murmurs


Abdomen:  Normal Bowel Sounds, Soft, No Tenderness, No Hepatosplenomegaly, No 

Masses


Extremities:  No Clubbing, No Cyanosis, No Edema, Normal Pulses, No Tenderness/

Swelling


Skin:  No Rashes, No Breakdown, No Significant Lesion


Neuro:  Normal Gait, Normal Speech, Strength at 5/5 X4 Ext, Normal Tone, 

Sensation Intact


Psych/Mental Status:  Mental Status NL, Mood NL





Results


Lab


Laboratory Tests


3/31/21 10:08








3/31/21 12:45








4/1/21 02:09














A/P-Cardiology


Admission Diagnosis


Acute ST elevation myocardial infarction


Coronary artery disease


Congestive heart failure, acute left ventricular systolic dysfunction, ischemic 

cardiomyopathy, EF 30%


Tobaccoism





Assessment/Plan


Acute ST elevation myocardial infarction in the anterior wall, status post 

emergency cardiac catheterization and stenting to the LAD, some distal 

embolization was noted, feeling well.  EKG today showed persistent ST elevation 

with Q waves in the anterior wall, will monitor for another 24 hours and 

possible discharge in the morning





Coronary artery disease, cardiac catheterization was done with emergency 

stenting to the LAD using 2 overlapping stent 2.75 x 12 and 2.5 x 23 Mariel 

stents expanded to 3.0 proximally and 2.75 distally with excellent results, mild

disease in the mid right coronary artery nonobstructive disease





Congestive heart failure, acute left ventricular systolic dysfunction, anterior 

and apical akinesia, could be stunned myocardium, will start on beta-blockers 

and ACE inhibitor and planning to place a LifeVest.  Ejection fraction 30%





Hypertension, started on Coreg and losartan





Hyperlipidemia, , started on Lipitor 80 mg daily and fish oil





Family history of heart disease





Tobaccoism, educated on smoking cessation





Clinical Quality Measures


AMI/AHF:


ASA po Prior to arrival:  ERIN Funes MD               Apr 1, 2021 08:52

## 2021-04-01 NOTE — DIAGNOSTIC IMAGING REPORT
INDICATION: Chest pain and shortness of breath.



Comparison made with prior examination from  03/12/2010.



FINDINGS: Heart size is normal. There are questionable patchy

right upper lobe infiltrate. There is no  pleural effusion or

pneumothorax. Mediastinum is unremarkable.



IMPRESSION: Questionable patchy right upper lobe infiltrate

otherwise unremarkable.



Dictated by: 



  Dictated on workstation # [id number]

## 2021-04-02 VITALS — SYSTOLIC BLOOD PRESSURE: 123 MMHG | DIASTOLIC BLOOD PRESSURE: 86 MMHG

## 2021-04-02 VITALS — DIASTOLIC BLOOD PRESSURE: 58 MMHG | SYSTOLIC BLOOD PRESSURE: 97 MMHG

## 2021-04-02 LAB
BASOPHILS # BLD AUTO: 0.1 10^3/UL (ref 0–0.1)
BASOPHILS NFR BLD AUTO: 0 % (ref 0–10)
BUN/CREAT SERPL: 15
CALCIUM SERPL-MCNC: 8.4 MG/DL (ref 8.5–10.1)
CHLORIDE SERPL-SCNC: 107 MMOL/L (ref 98–107)
CO2 SERPL-SCNC: 20 MMOL/L (ref 21–32)
CREAT SERPL-MCNC: 0.87 MG/DL (ref 0.6–1.3)
EOSINOPHIL # BLD AUTO: 0.2 10^3/UL (ref 0–0.3)
EOSINOPHIL NFR BLD AUTO: 2 % (ref 0–10)
GFR SERPLBLD BASED ON 1.73 SQ M-ARVRAT: > 60 ML/MIN
GLUCOSE SERPL-MCNC: 103 MG/DL (ref 70–105)
HCT VFR BLD CALC: 37 % (ref 40–54)
HGB BLD-MCNC: 12.2 G/DL (ref 13.3–17.7)
LYMPHOCYTES # BLD AUTO: 1.1 10^3/UL (ref 1–4)
LYMPHOCYTES NFR BLD AUTO: 10 % (ref 12–44)
MAGNESIUM SERPL-MCNC: 2.1 MG/DL (ref 1.6–2.4)
MANUAL DIFFERENTIAL PERFORMED BLD QL: NO
MCH RBC QN AUTO: 31 PG (ref 25–34)
MCHC RBC AUTO-ENTMCNC: 33 G/DL (ref 32–36)
MCV RBC AUTO: 93 FL (ref 80–99)
MONOCYTES # BLD AUTO: 1.1 10^3/UL (ref 0–1)
MONOCYTES NFR BLD AUTO: 10 % (ref 0–12)
NEUTROPHILS # BLD AUTO: 8.8 10^3/UL (ref 1.8–7.8)
NEUTROPHILS NFR BLD AUTO: 78 % (ref 42–75)
PHOSPHATE SERPL-MCNC: 2.5 MG/DL (ref 2.3–4.7)
PLATELET # BLD: 198 10^3/UL (ref 130–400)
PMV BLD AUTO: 10.9 FL (ref 9–12.2)
POTASSIUM SERPL-SCNC: 3.9 MMOL/L (ref 3.6–5)
SODIUM SERPL-SCNC: 138 MMOL/L (ref 135–145)
WBC # BLD AUTO: 11.4 10^3/UL (ref 4.3–11)

## 2021-04-02 RX ADMIN — TICAGRELOR SCH MG: 90 TABLET ORAL at 08:08

## 2021-04-02 RX ADMIN — ASPIRIN SCH MG: 81 TABLET ORAL at 08:08

## 2021-04-02 RX ADMIN — CARVEDILOL SCH MG: 3.12 TABLET, FILM COATED ORAL at 08:08

## 2021-04-02 RX ADMIN — OMEGA-3 FATTY ACIDS CAP 1000 MG SCH MG: 1000 CAP at 08:07

## 2021-04-02 RX ADMIN — PANTOPRAZOLE SODIUM SCH MG: 40 TABLET, DELAYED RELEASE ORAL at 08:08

## 2021-04-02 RX ADMIN — MAGNESIUM SULFATE IN DEXTROSE SCH MLS/HR: 10 INJECTION, SOLUTION INTRAVENOUS at 03:56

## 2021-04-02 RX ADMIN — SODIUM CHLORIDE SCH MLS/HR: 900 INJECTION, SOLUTION INTRAVENOUS at 03:42

## 2021-04-02 RX ADMIN — POTASSIUM CHLORIDE SCH MEQ: 1500 TABLET, EXTENDED RELEASE ORAL at 03:56

## 2021-04-02 RX ADMIN — POTASSIUM CHLORIDE SCH MLS/HR: 200 INJECTION, SOLUTION INTRAVENOUS at 03:56

## 2021-04-02 RX ADMIN — LOSARTAN POTASSIUM SCH MG: 25 TABLET, FILM COATED ORAL at 08:08

## 2021-04-02 NOTE — CARDIOLOGY DISCHARGE SUMMARY
Diagnosis/Chief Complaint


Date of Admission


Mar 31, 2021 at 11:08


Date of Discharge


4/2/2021


Admission Diagnosis


Acute STEMI





Final/Discharge Diagnosis


Acute STEMI





Chief Complaint/HPI


Chief Complaint/HPI


Acute STEMI





Discharge Summary


Procedures


Primary PCI with drug-eluting stent to the LAD.


Discharge Physical Examination


Unremarkable





Hospital Course


Was the Problem List Reviewed?:  Yes


LifeVest for primary prevention of sudden cardiac death.  LVEF 30%.





Discussion & Recommendations


Discussion


Medical compliance with Brilinta was discussed And with LifeVest.  Follow-up 

with Dr. Chao.


Follow up appt.:  


Dr. Chao.


Dicharge Diet:  Cardiac Diet


Activity as Tolerated:  Yes


Home Medications


Reviewed patient Home Medication Reconciliation performed by pharmacy medication

reconciliations technician and/or nursing.


Patients Allergies have been reviewed.





Discharge


Home Medications:


Reviewed and agree with Discharge Medication list on patient's Discharge 

Instruction sheet


Condition at discharge


Stable.


Instructions to patient/family


Discussed with the patient.





Clinical Quality Measures


AMI/AHF:


ASA po Prior to arrival:  HERBER Geller MD              Apr 2, 2021 15:31

## 2021-04-02 NOTE — CARDIOLOGY PROGRESS NOTE
Cardiology SOAP Progress Note


Subjective:


No chest pain.





Objective:


I&O/Vital Signs











 4/2/21 4/2/21 4/2/21 4/2/21





 04:09 07:00 08:25 08:46


 


Temp 37.0  36.8 


 


Pulse 72 70 74 


 


Resp 18  16 


 


B/P (MAP) 97/58 (71)  123/86 (98) 


 


Pulse Ox 96  97 


 


O2 Delivery Room Air  Room Air Room Air


 


    





 4/2/21   





 12:51   


 


Pulse 75   














 4/2/21





 00:00


 


Intake Total 940 ml


 


Balance 940 ml








Constitutional:  AAO x 3


Respiratory:  chest is bilaterally symmetric, lungs clear to auscultation


Cardiovascular:  regular rate-rhythm, S1 and S2


Gastrointestional:  soft, audible bowel sounds


Extremities:  no lower extremity edema bilateral


Neurologic/Psychiatric:  no motor/sensory deficits, alert, normal mood/affect, 

oriented x 3





Results/Procedures:


Labs


Laboratory Tests


4/2/21 02:50: 


White Blood Count 11.4H, Red Blood Count 4.00L, Hemoglobin 12.2L, Hematocrit 37L

, Mean Corpuscular Volume 93, Mean Corpuscular Hemoglobin 31, Mean Corpuscular 

Hemoglobin Concent 33, Red Cell Distribution Width 12.4, Platelet Count 198, 

Mean Platelet Volume 10.9, Immature Granulocyte % (Auto) 0, Neutrophils (%) 

(Auto) 78H, Lymphocytes (%) (Auto) 10L, Monocytes (%) (Auto) 10, Eosinophils (%)

(Auto) 2, Basophils (%) (Auto) 0, Neutrophils # (Auto) 8.8H, Lymphocytes # 

(Auto) 1.1, Monocytes # (Auto) 1.1H, Eosinophils # (Auto) 0.2, Basophils # 

(Auto) 0.1, Immature Granulocyte # (Auto) 0.1, Sodium Level 138, Potassium Level

3.9, Chloride Level 107, Carbon Dioxide Level 20L, Anion Gap 11, Blood Urea 

Nitrogen 13, Creatinine 0.87, Estimat Glomerular Filtration Rate > 60, 

BUN/Creatinine Ratio 15, Glucose Level 103, Calcium Level 8.4L, Phosphorus Level

2.5, Magnesium Level 2.1





Microbiology


3/31/21 MRSA Screen - Final, Complete


          MRSA not isolated








A/P:


Assessment/Dx:


Acute ST elevation myocardial infarction


Coronary artery disease


Congestive heart failure, acute left ventricular systolic dysfunction, ischemic 

cardiomyopathy, EF 30%


Tobaccoism


Plan:


Assessment/Plan


Acute ST elevation myocardial infarction in the anterior wall, status post 

emergency cardiac catheterization and stenting to the LAD, some distal 

embolization was noted, feeling well.  EKG today showed persistent ST elevation 

with Q waves in the anterior wall, will monitor for another 24 hours and 

possible discharge in the morning





Coronary artery disease, cardiac catheterization was done with emergency 

stenting to the LAD using 2 overlapping stent 2.75 x 12 and 2.5 x 23 Mariel sten

ts expanded to 3.0 proximally and 2.75 distally with excellent results, mild 

disease in the mid right coronary artery nonobstructive disease





Congestive heart failure, acute left ventricular systolic dysfunction, anterior 

and apical akinesia, could be stunned myocardium, will start on beta-blockers 

and ACE inhibitor Ejection fraction 30%. LifeVest for primary prevention of 

sudden cardiac death.





Hypertension, started on Coreg and losartan





Hyperlipidemia, , started on Lipitor 80 mg daily and fish oil





Family history of heart disease





Tobaccoism, educated on smoking cessation








Thank you for your consultation. Please call me if you have any questions.








LOR Wiggins MD, FACP, FACC, FSCAI, FHRS, CCDS


Interventional Cardiology


Cardiac Electrophysiology


Vascular Medicine and Endovascular Interventions





Clinical Quality Measures


AMI/AHF:


ASA po Prior to arrival:  HERBER Geller MD              Apr 2, 2021 15:29

## 2021-07-02 ENCOUNTER — HOSPITAL ENCOUNTER (OUTPATIENT)
Dept: HOSPITAL 75 - CARD | Age: 51
End: 2021-07-02
Attending: INTERNAL MEDICINE
Payer: SELF-PAY

## 2021-07-02 DIAGNOSIS — I11.9: Primary | ICD-10-CM

## 2021-07-02 DIAGNOSIS — I34.0: ICD-10-CM

## 2021-07-02 PROCEDURE — 93306 TTE W/DOPPLER COMPLETE: CPT

## 2021-09-03 ENCOUNTER — HOSPITAL ENCOUNTER (OUTPATIENT)
Dept: HOSPITAL 75 - CARD | Age: 51
End: 2021-09-03
Attending: INTERNAL MEDICINE
Payer: SELF-PAY

## 2021-09-03 DIAGNOSIS — I34.0: ICD-10-CM

## 2021-09-03 DIAGNOSIS — I11.9: Primary | ICD-10-CM

## 2021-09-03 PROCEDURE — 93306 TTE W/DOPPLER COMPLETE: CPT

## 2021-09-07 LAB
INR PPP: 0.8 (ref 0.8–1.4)
PROTHROMBIN TIME: 11.9 SEC (ref 12.2–14.7)

## 2021-09-09 LAB
INR PPP: 0.9 (ref 0.8–1.4)
PROTHROMBIN TIME: 12.4 SEC (ref 12.2–14.7)

## 2021-09-10 LAB
INR PPP: 0.9 (ref 0.8–1.4)
PROTHROMBIN TIME: 13 SEC (ref 12.2–14.7)

## 2021-09-13 LAB
INR PPP: 1.1 (ref 0.8–1.4)
PROTHROMBIN TIME: 14.2 SEC (ref 12.2–14.7)

## 2021-09-16 LAB
INR PPP: 1 (ref 0.8–1.4)
PROTHROMBIN TIME: 13.7 SEC (ref 12.2–14.7)

## 2021-09-17 LAB
INR PPP: 1 (ref 0.8–1.4)
PROTHROMBIN TIME: 13.8 SEC (ref 12.2–14.7)

## 2021-09-20 LAB
INR PPP: 1.1 (ref 0.8–1.4)
PROTHROMBIN TIME: 14.9 SEC (ref 12.2–14.7)

## 2021-09-22 LAB
INR PPP: 1.2 (ref 0.8–1.4)
PROTHROMBIN TIME: 16 SEC (ref 12.2–14.7)

## 2021-09-24 LAB
INR PPP: 0.9 (ref 0.8–1.4)
PROTHROMBIN TIME: 12.9 SEC (ref 12.2–14.7)

## 2021-09-27 LAB
INR PPP: 1 (ref 0.8–1.4)
PROTHROMBIN TIME: 14 SEC (ref 12.2–14.7)

## 2021-09-29 LAB
INR PPP: 1.2 (ref 0.8–1.4)
PROTHROMBIN TIME: 15.1 SEC (ref 12.2–14.7)

## 2021-10-06 LAB
INR PPP: 2.5 (ref 0.8–1.4)
PROTHROMBIN TIME: 27.5 SEC (ref 12.2–14.7)

## 2021-10-13 LAB
INR PPP: 3 (ref 0.8–1.4)
PROTHROMBIN TIME: 31.1 SEC (ref 12.2–14.7)

## 2021-10-20 LAB
INR PPP: 1.1 (ref 0.8–1.4)
PROTHROMBIN TIME: 14.5 SEC (ref 12.2–14.7)

## 2021-10-27 LAB
INR PPP: 2 (ref 0.8–1.4)
PROTHROMBIN TIME: 23 SEC (ref 12.2–14.7)

## 2021-11-03 LAB
INR PPP: 2.1 (ref 0.8–1.4)
PROTHROMBIN TIME: 23.9 SEC (ref 12.2–14.7)

## 2021-11-17 LAB
INR PPP: 3.2 (ref 0.8–1.4)
PROTHROMBIN TIME: 33.3 SEC (ref 12.2–14.7)

## 2021-12-01 ENCOUNTER — HOSPITAL ENCOUNTER (OUTPATIENT)
Dept: HOSPITAL 75 - LAB | Age: 51
LOS: 5 days | Discharge: HOME | End: 2021-12-06
Attending: INTERNAL MEDICINE
Payer: COMMERCIAL

## 2021-12-01 DIAGNOSIS — Z79.01: ICD-10-CM

## 2021-12-01 DIAGNOSIS — Z51.81: Primary | ICD-10-CM

## 2021-12-01 LAB
INR PPP: 2.5 (ref 0.8–1.4)
PROTHROMBIN TIME: 27.4 SEC (ref 12.2–14.7)

## 2021-12-01 PROCEDURE — 85610 PROTHROMBIN TIME: CPT

## 2021-12-01 PROCEDURE — 36415 COLL VENOUS BLD VENIPUNCTURE: CPT

## 2022-01-06 ENCOUNTER — HOSPITAL ENCOUNTER (OUTPATIENT)
Dept: HOSPITAL 75 - LAB | Age: 52
End: 2022-01-06
Attending: PHYSICIAN ASSISTANT
Payer: COMMERCIAL

## 2022-01-06 DIAGNOSIS — E78.2: Primary | ICD-10-CM

## 2022-01-06 LAB
ALBUMIN SERPL-MCNC: 4 GM/DL (ref 3.2–4.5)
ALP SERPL-CCNC: 61 U/L (ref 40–136)
ALT SERPL-CCNC: 39 U/L (ref 0–55)
BILIRUB SERPL-MCNC: 0.7 MG/DL (ref 0.1–1)
BUN/CREAT SERPL: 12
CALCIUM SERPL-MCNC: 8.5 MG/DL (ref 8.5–10.1)
CHLORIDE SERPL-SCNC: 106 MMOL/L (ref 98–107)
CHOLEST SERPL-MCNC: 113 MG/DL (ref ?–200)
CO2 SERPL-SCNC: 23 MMOL/L (ref 21–32)
CREAT SERPL-MCNC: 0.98 MG/DL (ref 0.6–1.3)
GFR SERPLBLD BASED ON 1.73 SQ M-ARVRAT: 80 ML/MIN
GLUCOSE SERPL-MCNC: 122 MG/DL (ref 70–105)
HDLC SERPL-MCNC: 22 MG/DL (ref 40–60)
POTASSIUM SERPL-SCNC: 3.9 MMOL/L (ref 3.6–5)
PROT SERPL-MCNC: 6.3 GM/DL (ref 6.4–8.2)
SODIUM SERPL-SCNC: 140 MMOL/L (ref 135–145)
TRIGL SERPL-MCNC: 193 MG/DL (ref ?–150)
VLDLC SERPL CALC-MCNC: 39 MG/DL (ref 5–40)

## 2022-01-06 PROCEDURE — 80053 COMPREHEN METABOLIC PANEL: CPT

## 2022-01-06 PROCEDURE — 36415 COLL VENOUS BLD VENIPUNCTURE: CPT

## 2022-01-06 PROCEDURE — 80061 LIPID PANEL: CPT

## 2022-11-04 ENCOUNTER — HOSPITAL ENCOUNTER (OUTPATIENT)
Dept: HOSPITAL 75 - CARD | Age: 52
End: 2022-11-04
Attending: INTERNAL MEDICINE
Payer: COMMERCIAL

## 2022-11-04 DIAGNOSIS — I34.0: Primary | ICD-10-CM

## 2022-11-04 DIAGNOSIS — I10: ICD-10-CM

## 2022-11-04 PROCEDURE — 93306 TTE W/DOPPLER COMPLETE: CPT

## 2022-11-14 ENCOUNTER — HOSPITAL ENCOUNTER (OUTPATIENT)
Dept: HOSPITAL 75 - CARD | Age: 52
End: 2022-11-14
Attending: INTERNAL MEDICINE
Payer: COMMERCIAL

## 2022-11-14 VITALS — SYSTOLIC BLOOD PRESSURE: 130 MMHG | DIASTOLIC BLOOD PRESSURE: 83 MMHG

## 2022-11-14 VITALS — DIASTOLIC BLOOD PRESSURE: 87 MMHG | SYSTOLIC BLOOD PRESSURE: 149 MMHG

## 2022-11-14 DIAGNOSIS — I11.9: Primary | ICD-10-CM

## 2022-11-14 PROCEDURE — 78452 HT MUSCLE IMAGE SPECT MULT: CPT

## 2022-11-14 PROCEDURE — 93017 CV STRESS TEST TRACING ONLY: CPT

## 2022-11-14 NOTE — CARDIOLOGY STRESS TEST REPORT
Stress Test Report


Date of Procedure/Referring:


Date of Procedure:  Nov 14, 2022


PCP


Adela Acuna MD


Admitting Physician


Admitting Physician:


 








Attending Physician:


David Chao MD





Baseline Heart Rate:


52





Baseline Blood Pressure:


Blood Pressure Systolic:  130


Blood Pressure Diastolic:  83


Baseline Vitals





Vital Signs








  Date Time  Temp Pulse Resp B/P (MAP) Pulse Ox O2 Delivery O2 Flow Rate FiO2


 


11/14/22 09:46  52  149/87 (107)    











Baseline EKG:


Baseline EKG:  NSR





Summary


After explaining the procedure to the patient, he  signed a consent and then 

brought to the stress nuclear laboratory.


Patient received 0.4 mg Lexiscan for stress test, ECG, heart rate and blood 

pressure were monitored continuously.  Resting and stress dose of radio tracer 

were injected, imaging was acquired and reviewed in short axis, horizontal long 

axis and vertical long axis views.


TID:  1.07


SSS:  40


SDS:  3


EF:  36


1.  Patient was unable to exercise beyond 6 minutes on standard Jono protocol, 

did not achieve target heart rate, test converted to Lexiscan Myoview stress 

test


2.  Patient tolerated Lexiscan well


3.  Large area of infarction involving the mid to apical anterior wall apex and 

inferior apical segment with no significant reversibility


4.  Diffuse left ventricular hypokinesia more pronounced at the anterior wall 

and apex, ejection fraction 36%





Copy


Copies To 1:   ADELA ACUNA MD, BASHAR J MD              Nov 14, 2022 12:01

## 2022-11-30 ENCOUNTER — HOSPITAL ENCOUNTER (OUTPATIENT)
Dept: HOSPITAL 75 - CATH | Age: 52
LOS: 1 days | Discharge: HOME | End: 2022-12-01
Attending: INTERNAL MEDICINE
Payer: COMMERCIAL

## 2022-11-30 VITALS — SYSTOLIC BLOOD PRESSURE: 172 MMHG | DIASTOLIC BLOOD PRESSURE: 103 MMHG

## 2022-11-30 VITALS — DIASTOLIC BLOOD PRESSURE: 92 MMHG | SYSTOLIC BLOOD PRESSURE: 149 MMHG

## 2022-11-30 VITALS — SYSTOLIC BLOOD PRESSURE: 151 MMHG | DIASTOLIC BLOOD PRESSURE: 89 MMHG

## 2022-11-30 VITALS — DIASTOLIC BLOOD PRESSURE: 92 MMHG | SYSTOLIC BLOOD PRESSURE: 165 MMHG

## 2022-11-30 VITALS — SYSTOLIC BLOOD PRESSURE: 175 MMHG | DIASTOLIC BLOOD PRESSURE: 107 MMHG

## 2022-11-30 VITALS — SYSTOLIC BLOOD PRESSURE: 162 MMHG | DIASTOLIC BLOOD PRESSURE: 86 MMHG

## 2022-11-30 VITALS — DIASTOLIC BLOOD PRESSURE: 94 MMHG | SYSTOLIC BLOOD PRESSURE: 160 MMHG

## 2022-11-30 VITALS — SYSTOLIC BLOOD PRESSURE: 151 MMHG | DIASTOLIC BLOOD PRESSURE: 96 MMHG

## 2022-11-30 VITALS — DIASTOLIC BLOOD PRESSURE: 105 MMHG | SYSTOLIC BLOOD PRESSURE: 158 MMHG

## 2022-11-30 VITALS — DIASTOLIC BLOOD PRESSURE: 94 MMHG | SYSTOLIC BLOOD PRESSURE: 152 MMHG

## 2022-11-30 VITALS — DIASTOLIC BLOOD PRESSURE: 91 MMHG | SYSTOLIC BLOOD PRESSURE: 151 MMHG

## 2022-11-30 VITALS — BODY MASS INDEX: 33.7 KG/M2 | WEIGHT: 214.73 LBS | HEIGHT: 67.01 IN

## 2022-11-30 VITALS — DIASTOLIC BLOOD PRESSURE: 93 MMHG | SYSTOLIC BLOOD PRESSURE: 146 MMHG

## 2022-11-30 VITALS — SYSTOLIC BLOOD PRESSURE: 155 MMHG | DIASTOLIC BLOOD PRESSURE: 92 MMHG

## 2022-11-30 DIAGNOSIS — I65.23: ICD-10-CM

## 2022-11-30 DIAGNOSIS — I50.22: ICD-10-CM

## 2022-11-30 DIAGNOSIS — I24.8: ICD-10-CM

## 2022-11-30 DIAGNOSIS — I25.10: Primary | ICD-10-CM

## 2022-11-30 DIAGNOSIS — I10: ICD-10-CM

## 2022-11-30 DIAGNOSIS — E78.2: ICD-10-CM

## 2022-11-30 DIAGNOSIS — Z79.82: ICD-10-CM

## 2022-11-30 DIAGNOSIS — Z79.899: ICD-10-CM

## 2022-11-30 DIAGNOSIS — I25.5: ICD-10-CM

## 2022-11-30 DIAGNOSIS — Z87.891: ICD-10-CM

## 2022-11-30 DIAGNOSIS — I34.0: ICD-10-CM

## 2022-11-30 DIAGNOSIS — I42.0: ICD-10-CM

## 2022-11-30 LAB
ALBUMIN SERPL-MCNC: 4.5 GM/DL (ref 3.2–4.5)
ALP SERPL-CCNC: 68 U/L (ref 40–136)
ALT SERPL-CCNC: 84 U/L (ref 0–55)
APTT BLD: 28 SEC (ref 24–35)
APTT PPP: YELLOW S
BACTERIA #/AREA URNS HPF: NEGATIVE /HPF
BILIRUB SERPL-MCNC: 1 MG/DL (ref 0.1–1)
BILIRUB UR QL STRIP: NEGATIVE
BUN/CREAT SERPL: 14
CALCIUM SERPL-MCNC: 9.3 MG/DL (ref 8.5–10.1)
CHLORIDE SERPL-SCNC: 105 MMOL/L (ref 98–107)
CHOLEST SERPL-MCNC: 135 MG/DL (ref ?–200)
CO2 SERPL-SCNC: 25 MMOL/L (ref 21–32)
CREAT SERPL-MCNC: 1.18 MG/DL (ref 0.6–1.3)
FIBRINOGEN PPP-MCNC: CLEAR MG/DL
GFR SERPLBLD BASED ON 1.73 SQ M-ARVRAT: 73 ML/MIN
GLUCOSE SERPL-MCNC: 123 MG/DL (ref 70–105)
GLUCOSE UR STRIP-MCNC: NEGATIVE MG/DL
HCT VFR BLD CALC: 43 % (ref 40–54)
HDLC SERPL-MCNC: 28 MG/DL (ref 40–60)
HGB BLD-MCNC: 14.9 G/DL (ref 13.3–17.7)
INR PPP: 0.8 (ref 0.8–1.4)
KETONES UR QL STRIP: NEGATIVE
LEUKOCYTE ESTERASE UR QL STRIP: NEGATIVE
MCH RBC QN AUTO: 31 PG (ref 25–34)
MCHC RBC AUTO-ENTMCNC: 35 G/DL (ref 32–36)
MCV RBC AUTO: 90 FL (ref 80–99)
NITRITE UR QL STRIP: NEGATIVE
PH UR STRIP: 5.5 [PH] (ref 5–9)
PLATELET # BLD: 201 10^3/UL (ref 130–400)
PMV BLD AUTO: 10.6 FL (ref 9–12.2)
POTASSIUM SERPL-SCNC: 4 MMOL/L (ref 3.6–5)
PROT SERPL-MCNC: 7 GM/DL (ref 6.4–8.2)
PROT UR QL STRIP: NEGATIVE
PROTHROMBIN TIME: 12 SEC (ref 12.2–14.7)
RBC #/AREA URNS HPF: (no result) /HPF
SODIUM SERPL-SCNC: 142 MMOL/L (ref 135–145)
SP GR UR STRIP: 1.02 (ref 1.02–1.02)
SQUAMOUS #/AREA URNS HPF: (no result) /HPF
TRIGL SERPL-MCNC: 143 MG/DL (ref ?–150)
VLDLC SERPL CALC-MCNC: 29 MG/DL (ref 5–40)
WBC # BLD AUTO: 7.8 10^3/UL (ref 4.3–11)
WBC #/AREA URNS HPF: (no result) /HPF

## 2022-11-30 PROCEDURE — 85610 PROTHROMBIN TIME: CPT

## 2022-11-30 PROCEDURE — 93641 EP EVL 1/2CHMB PAC CVDFB TST: CPT

## 2022-11-30 PROCEDURE — 80061 LIPID PANEL: CPT

## 2022-11-30 PROCEDURE — 81000 URINALYSIS NONAUTO W/SCOPE: CPT

## 2022-11-30 PROCEDURE — 80053 COMPREHEN METABOLIC PANEL: CPT

## 2022-11-30 PROCEDURE — 93458 L HRT ARTERY/VENTRICLE ANGIO: CPT

## 2022-11-30 PROCEDURE — 85027 COMPLETE CBC AUTOMATED: CPT

## 2022-11-30 PROCEDURE — 93005 ELECTROCARDIOGRAM TRACING: CPT

## 2022-11-30 PROCEDURE — 36415 COLL VENOUS BLD VENIPUNCTURE: CPT

## 2022-11-30 PROCEDURE — 87081 CULTURE SCREEN ONLY: CPT

## 2022-11-30 PROCEDURE — 33249 INSJ/RPLCMT DEFIB W/LEAD(S): CPT

## 2022-11-30 PROCEDURE — 85730 THROMBOPLASTIN TIME PARTIAL: CPT

## 2022-11-30 PROCEDURE — 71045 X-RAY EXAM CHEST 1 VIEW: CPT

## 2022-11-30 RX ADMIN — SODIUM CHLORIDE SCH MLS/HR: 900 INJECTION, SOLUTION INTRAVENOUS at 13:26

## 2022-11-30 RX ADMIN — SODIUM CHLORIDE SCH MLS/HR: 900 INJECTION, SOLUTION INTRAVENOUS at 22:09

## 2022-11-30 RX ADMIN — SODIUM CHLORIDE SCH MLS/HR: 900 INJECTION INTRAVENOUS at 18:24

## 2022-11-30 NOTE — CARDIAC PROCEDURE NOTE-CS/ASA
Pre-Procedure Note


Pre-Op Procedure Note


Date of Available H&P:  Nov 17, 2022


Date H&P Reviewed:  Nov 30, 2022


Time H&P Reviewed:  10:40


History & Physical:  H&P Reviewed, Patient Examed, No changes noted


Pre-Operative Diagnosis:  CAD, CHF





Conscious Sedation Pre-Proced


Time


10:40





ASA Score


3


For ASA 3 and 4: Consider anesthesia and medical clearance. Also, for patients

with a history of failed moderate sedation consider anesthesia.

















Airway 


 


Lungs 


 


Heart 


 


 ASA score


 


 ASA 1: a normal healthy patient


 


 ASA 2:  a patient with a mild systemic disease (mid diabetes, controlled 

hypertension, obesity 


 


 ASA 3:  a patient with a severe systemic disease that limits activity  (angina,

COPD, prior Myocardial infarction)


 


 ASA 4:  a patient with an incapacitating disease that is a constant threat to 

life (CHF, renal failure)


 


 ASA 5:  a moribund patient not expected to survive 24 hrs.  (ruptured aneurysm)


 


 ASA 6:  a declared brain-dead patient whose organs are being harvested.


 


 For emergent operations, add the letter E after the classification











Mallampati Classification


Grade 3





Sedation Plan


Analgesia, Amnesia, Plan communicated to team members, Discussed options with 

patient/fam, Discussed risks with patient/fam


The patient is an appropriate candidate to undergo the planned procedure, 

sedation, and anesthesia.





The patient immediately re-assessed prior to indication.











ERIN RESENDIZ MD              Nov 30, 2022 10:40

## 2022-11-30 NOTE — DIAGNOSTIC IMAGING REPORT
INDICATION: Post pacemaker



FINDINGS: Battery overlies the left chest. The pacemaker device

unremarkable. There is no pneumothorax. The lungs are clear.



IMPRESSION: Pacemaker placement without apparent complication or

acute-appearing abnormality. 



Dictated by: 



  Dictated on workstation # [id number]

## 2022-11-30 NOTE — CARDIAC CATH REPORT
Cardiac Cath Report


Physician (s)/Assistant (s)


Physician


ERIN RESENDIZ MD





Pre-Procedure Diagnosis


Pre-Procedure Diagnosis:  CAD, CHF





Post-Procedure Note


Procedure Start Date:  Nov 30, 2022


Name of Procedure:  


Left heart catheterization


Left ventriculogram


Findings/Procedure Note


PROCEDURE NOTE:


52-year-old gentleman with history of coronary artery disease, had congestive 

heart failure, ischemic in nature, maximized on medical therapy, scheduled for c

ardiac catheterization done for single-chamber ICD implant.


After explaining the procedure to the patient, all pros and cons were explained,

all questions were answered.  The patient signed the consent and then he  was 

placed on the cardiac catheterization laboratory. Groin was prepped SL fashion 

local anesthesia was used. Sheath placed in the right radial artery, Gilmore 

catheter was advanced to the left ventricular cavity, left ventriculogram was 

done, pressure was measured, pullback LV to aorta was done.  Engaged the right 

and left coronary system.  Angiogram was done.


At the end of the procedure the sheath was removed.  Vascular band was used





FINDINGS:





Hemodynamics 


/15, end-diastolic pressure of 15


Aorta 95/67 mean of 80





ANATOMY:


Left Main is free of obstructive disease


Left Anterior Descending had a patent stent in the mid LAD with mild disease 

distally nonobstructive disease


Left Circumflex has 50% stenosis in the mid circumflex artery nonobstructive 

disease


Right Coronary Artery has 50% stenosis in the mid right coronary artery 

nonobstructive disease


LV Gram was done showing prominent left ventricle with anterior wall 

hypokinesia, ejection fraction 35%





CONCLUSION:


1.  Patent stent in the mid LAD otherwise mild disease nonobstructive disease


2.  Normal left ventricular size, hypokinesia of the anterior wall, ejection 

fraction 35%





DISCUSSION AND RECOMMENDATION:


Planning to proceed with single-chamber ICD, continue to maximize medical 

therapy


Anesthesia Type:  Conscious Sedation


Estimated blood loss (mL):  10 ml


Contrast Amount:  35 ml


Total Radiation Dose:  443 mGy





Post-Procedure Diagnosis


Post-operative diagnosis:  


Coronary artery disease


Congestive heart failure, chronic compensated left ventricular systolic


dysfunction, ischemic cardiomyopathy, dilated cardiomyopathy


Hypertension


Hyperlipidemia











ERIN RESENDIZ MD              Nov 30, 2022 11:26

## 2022-11-30 NOTE — ANESTHESIA-GENERAL POST-OP
MAC


Patient Condition


Mental Status/LOC:  Same as Preop


Cardiovascular:  Satisfactory


Nausea/Vomiting:  Absent


Respiratory:  Satisfactory


Pain:  Controlled


Complications:  Absent





Post Op Complications


Complications


None





Follow Up Care/Instructions


Patient Instructions


None needed.





Anesthesiology Discharge Order


Discharge Order


Patient is doing well, no complaints, stable vital signs, no apparent adverse 

anesthesia problems.   


No complications reported per nursing.











ALFRED MIGUEL CRNA              Nov 30, 2022 14:21

## 2022-11-30 NOTE — ICD IMPLANTATION
Single Chamber ICD Implant


DATE OF SERVICE:  52 male 





SINGLE CHAMBER ICD IMPLANTATION





CARDIOLOGIST:


Erin Chao 





INDICATION:


Primary prevention for severe cardiomyopathy





HISTORY:


ICD implantation is recommended.





PROCEDURE PERFORMED:


1.  Single-chamber ICD implantation.


2.  DFT testing.





COMPLICATIONS: None.


ESTIMATED BLOOD LOSS: 20 mL.


SPECIMENS: None.


ANESTHESIA: Conscious sedation.


ORAL ANTICOAGULATION: None.


FLUOROSCOPY TIME:


FLUOROSCOPY DOSE:


CONTRAST DOSE:





PROCEDURE DETAILS:


52-year-old gentleman with coronary artery disease, severe cardiomyopathy, on 

medical therapy.  Repeat echo showed no improvement in left ventricular 

function, scheduled for single-chamber ICD implant with DFT testing for primary 

prevention.


After all the questions were answered, an informed consent was taken.  All the 

risks and complication were explained in detail.  The patient was brought to the

EP lab.  The patient's right and left chest was prepped and draped in the usual 

sterile fashion.  A 2-inch horizontal incision was made 1 cm below the clavicle 

and dissection carried down to the pectoralis fascia.  IV antibiotics were 

administered prior to first incision.  Under fluoroscopic guidance, access was 

gained in the axillary vein and a regular J-wire was placed.  We then introduced

a sheath into the axillary vein.  A  ICD lead was inserted.   This is a 

single-coiled ICD lead.  The RV lead was inserted across the tricuspid valve to 

an apical septal portion of the RV. The lead position was checked in LIZ and ZAMORA

view.  The screw was deployed and lead connected to the .  Good 

sensing and pacing thresholds were obtained.  Diaphragmatic pacing was ruled 

out.  The lead was secured with 2-0 Vicryl nonabsorbable sutures.  The lead was 

secured to the underlying muscle and fascia.  We then took an ICD generator and 

the lead was connected to the device in a hermetic fashion.  The device and it 

was placed in the pocket.  Aggressive irrigation with normal saline solution was

done.  Interrogation of the device revealed good integrity of the leads and 

connection.  The wound was closed using 2 layers.  The first layer was an 

interrupted 2-0 Vicryl.  The second layer was an uninterrupted 4-0 Vicryl 

suture.  Half inch Steri-Strips and a small dressing was then applied to the 

wound.  


DFT testing was done with anesthesia support. The induction mechanism was a T-

shock.  The first T-shock was at 290 milliseconds at one joule.  Nonsustained VF

was noted.  We could not give therapy since it was nonsustained.  


Then, we did T shock at 300 ms was 0.6 J and it was successful in creating 

ventricular fibrillation, patient received single shock with 25 J and it was 

successful in terminating fibrillation.





DEVICE INFORMATION: VISIA MRI AF Serial [id number]


      





INTRAOPERATIVE DEVICE TESTING: Good sensing and capture activity





DEVICE INTERROGATION IMMEDIATELY POSTOP:


R wave measured at 9 mV, pacing impedance 589, HVB impedance 42, HVX impedance 

54, pacing threshold 1.5 V at 0.4 ms





PLAN:


The patient will be observed for 23 hours.  We will continue with two more 

dosages of IV antibiotics.  We will check a chest x-ray and interrogate the 

device in the morning.  An EKG will be done as well.  If everything checks out, 

the patient will be discharged tomorrow.





CONCLUSION:


Successful implantation of single-chamber ICD for primary prevention


Successful DFT testing





FINAL DIAGNOSIS:


Congestive heart failure, dilated left ventricle, chronic compensated left 

ventricular systolic dysfunction, ischemic cardiomyopathy


Coronary artery disease


Hypertension


Hyperlipidemia











ERIN CHAO MD              Nov 30, 2022 13:12

## 2022-11-30 NOTE — DIAGNOSTIC IMAGING REPORT
INDICATION: 

Coronary artery disease. 



FINDINGS: 

The lungs are clear. There is no failure, effusion, or

pneumothorax. 



IMPRESSION: 

Unremarkable frontal chest.



Dictated by: 



  Dictated on workstation # [id number]

## 2022-12-01 VITALS — DIASTOLIC BLOOD PRESSURE: 80 MMHG | SYSTOLIC BLOOD PRESSURE: 125 MMHG

## 2022-12-01 VITALS — SYSTOLIC BLOOD PRESSURE: 168 MMHG | DIASTOLIC BLOOD PRESSURE: 96 MMHG

## 2022-12-01 VITALS — DIASTOLIC BLOOD PRESSURE: 88 MMHG | SYSTOLIC BLOOD PRESSURE: 146 MMHG

## 2022-12-01 LAB
ALBUMIN SERPL-MCNC: 3.9 GM/DL (ref 3.2–4.5)
ALP SERPL-CCNC: 58 U/L (ref 40–136)
ALT SERPL-CCNC: 55 U/L (ref 0–55)
BILIRUB SERPL-MCNC: 0.9 MG/DL (ref 0.1–1)
BUN/CREAT SERPL: 16
CALCIUM SERPL-MCNC: 9 MG/DL (ref 8.5–10.1)
CHLORIDE SERPL-SCNC: 104 MMOL/L (ref 98–107)
CO2 SERPL-SCNC: 23 MMOL/L (ref 21–32)
CREAT SERPL-MCNC: 0.98 MG/DL (ref 0.6–1.3)
GFR SERPLBLD BASED ON 1.73 SQ M-ARVRAT: 92 ML/MIN
GLUCOSE SERPL-MCNC: 158 MG/DL (ref 70–105)
HCT VFR BLD CALC: 40 % (ref 40–54)
HGB BLD-MCNC: 13.5 G/DL (ref 13.3–17.7)
MCH RBC QN AUTO: 31 PG (ref 25–34)
MCHC RBC AUTO-ENTMCNC: 34 G/DL (ref 32–36)
MCV RBC AUTO: 91 FL (ref 80–99)
PLATELET # BLD: 167 10^3/UL (ref 130–400)
PMV BLD AUTO: 10.5 FL (ref 9–12.2)
POTASSIUM SERPL-SCNC: 3.8 MMOL/L (ref 3.6–5)
PROT SERPL-MCNC: 6.1 GM/DL (ref 6.4–8.2)
SODIUM SERPL-SCNC: 137 MMOL/L (ref 135–145)
WBC # BLD AUTO: 7.7 10^3/UL (ref 4.3–11)

## 2022-12-01 RX ADMIN — SODIUM CHLORIDE SCH MLS/HR: 900 INJECTION, SOLUTION INTRAVENOUS at 07:48

## 2022-12-01 RX ADMIN — SODIUM CHLORIDE SCH MLS/HR: 900 INJECTION INTRAVENOUS at 03:21

## 2022-12-01 NOTE — DISCHARGE INST-POST CATH
Discharge Inst-CATH/EP


Problems Reviewed?:  Yes


Post Cardiac Cath/EP D/C Inst


Follow Up/Plan


Appointment with Dr Chao in one week


<b>CARDIAC CATH/EP PROCEDURE DISCHARGE INSTRUCTIONS</b>





ACTIVITY





* Go Home directly and rest.


* Limit activity of the leg (or wrist if it was used) for 7 days including 

aerobics, swimming,


   jogging, bicycling, etc.


* Restrict stair-climbing for 7 days if possible, if not, climb up with your 

non-cath leg, then


   bring together on the same step.


* Avoid lifting, pushing, pulling or excessive movement of the affected 

extremity for 7 days.


* Customary sexual activity may be resumed after 2 days-use caution not to use a

position  


   that strains or causes pain to the affected extremity.


* No driving for 24 hours.


* NO SMOKING. 


* Avoid straining for bowel movements for 7 days.


* Gentle walking on level ground is allowed.


* Returning to work will depend on the type of procedure and the results. Your 

doctor will discuss


   this with you.





CALL YOUR DOCTOR FOR ANY OF THE FOLLOWING:





*If bleeding from the puncture site occurs- Apply gentle pressure to site with 

clean cloth and call


   your doctor or EMS.


* If a knot or lump forms under the skin, increases in size, or causes pain.


* If bruising appears to be worsening or moving further down your leg instead of

disappearing.


* Temperature above 101 F.





CARE OF YOUR GROIN INCISION;





* Bruising or purple discoloration of the skin near the puncture site is common.


* You may shower only, no bathtub bathing for 5 days.  Be careful to avoid 

slipping as your


   leg may feel stiff.


* If a closure device was used on your femoral artery, please see the attached 

guide regarding


   care of the device and your leg.


* Leave dressing on FOR 24 hours.





CARE OF YOUR WRIST INCISION;





* Bruising or purple discoloration of the skin near the puncture site is common.


* You may shower.


* DO NOT submerge wrist.


* Leave dressing on FOR 24 hours.











ERIN CHAO MD               Dec 1, 2022 06:47

## 2022-12-01 NOTE — CARDIOLOGY PROGRESS NOTE
Subjective


Date Seen by Provider:  Dec 1, 2022


Time Seen by Provider:  08:50


Subjective/Events-last exam


Patient was seen at bedside, laying down comfortably, site is healing well.


Review of Systems


General:  No Chills, No Night Sweats, No Fatigue, No Malaise, No Appetite, No 

Other


HEENT:  No Head Aches, No Visual Changes, No Eye Pain, No Ear Pain, No 

Dysphasia, No Sinus Congestion, No Post Nasal Drip, No Sore Throat, No Other


Pulmonary:  No Dyspnea, No Cough, No Pleuritic Chest Pain, No Other


Cardiovascular:  No: Chest Pain, Palpitations, Orthopnea, Paroxysmal Noc. 

Dyspnea, Edema, Lt Headedness, Other





Objective-Cardiology


Exam


Last Set of Vital Signs





Vital Signs








 12/1/22





 08:00


 


Temp 36.5


 


Pulse 66


 


Resp 16


 


B/P (MAP) 168/96 (120)


 


Pulse Ox 96


 


O2 Delivery Room Air








I&O











Intake and Output 


 


 12/1/22





 00:00


 


Intake Total 300 ml


 


Balance 300 ml


 


 


 


Intake Oral 300 ml


 


# Voids 2








General:  Alert, Oriented X3, Cooperative


HEENT:  Atraumatic, PERRLA


Neck:  Supple, No JVD, No Thyromegaly


Lungs:  Clear to Auscultation, Normal Air Movement


Heart:  Regular Rate, Normal S1, Normal S2, No Murmurs


Abdomen:  Normal Bowel Sounds, Soft, No Tenderness, No Hepatosplenomegaly, No 

Masses


Extremities:  No Clubbing, No Cyanosis, No Edema, Normal Pulses, No 

Tenderness/Swelling


Skin:  No Rashes, No Breakdown, No Significant Lesion


Neuro:  Normal Gait, Normal Speech, Strength at 5/5 X4 Ext, Normal Tone, Sens

ation Intact


Psych/Mental Status:  Mental Status NL, Mood NL





Results


Lab


Laboratory Tests


12/1/22 05:00














A/P-Cardiology


Admission Diagnosis


Congestive heart failure, dilated cardiomyopathy, chronic compensated left 

ventricular systolic dysfunction, ischemic cardiomyopathy


Coronary artery disease


Hypertension


Hyperlipidemia





Assessment/Plan


Congestive heart failure, chronic left ventricular systolic dysfunction, dilated

cardiomyopathy, ischemic.


Cardiac catheterization was carried out on November 30, 2022 showing mild 

disease nonobstructive disease





Status post single-chamber ICD implantation with DFT testing for primary 

prevention, site is healing well.





Hypertension, continue current medication monitor blood pressure





Hyperlipidemia, maintained on Lipitor 80 mg daily





Planning for discharge today











ERIN RESENDIZ MD               Dec 1, 2022 08:52